# Patient Record
Sex: FEMALE | Race: WHITE | NOT HISPANIC OR LATINO | Employment: OTHER | URBAN - METROPOLITAN AREA
[De-identification: names, ages, dates, MRNs, and addresses within clinical notes are randomized per-mention and may not be internally consistent; named-entity substitution may affect disease eponyms.]

---

## 2022-01-14 PROBLEM — R42 DIZZINESS: Status: ACTIVE | Noted: 2022-01-14

## 2022-01-14 PROBLEM — R06.02 SHORTNESS OF BREATH: Status: ACTIVE | Noted: 2022-01-14

## 2022-01-14 NOTE — PROGRESS NOTES
Date of Office Visit: 2022  Encounter Provider: Dr. Enrico Sepulveda    Place of Service: [facility]  Patient Name: Miriam Riggs  :1965  No primary care provider on file.    No chief complaint on file.    History of Present Illness        Past Medical History:   Diagnosis Date   • Dizziness 2022   • Shortness of breath 2022         No past surgical history on file.      No current outpatient medications on file.             ROS    Procedures    Procedures    No orders to display           Objective:    There were no vitals taken for this visit.        Physical Exam        Assessment:       Diagnosis Plan   1. Shortness of breath     2. Dizziness              Plan:

## 2022-01-17 ENCOUNTER — OFFICE VISIT (OUTPATIENT)
Dept: CARDIOLOGY | Facility: CLINIC | Age: 57
End: 2022-01-17

## 2022-01-17 VITALS
SYSTOLIC BLOOD PRESSURE: 123 MMHG | OXYGEN SATURATION: 94 % | BODY MASS INDEX: 43.32 KG/M2 | HEIGHT: 67 IN | DIASTOLIC BLOOD PRESSURE: 78 MMHG | WEIGHT: 276 LBS | HEART RATE: 78 BPM

## 2022-01-17 DIAGNOSIS — R07.89 CHEST DISCOMFORT: ICD-10-CM

## 2022-01-17 DIAGNOSIS — E11.9 TYPE 2 DIABETES MELLITUS WITHOUT COMPLICATION, UNSPECIFIED WHETHER LONG TERM INSULIN USE: ICD-10-CM

## 2022-01-17 DIAGNOSIS — E78.2 MIXED HYPERLIPIDEMIA: ICD-10-CM

## 2022-01-17 DIAGNOSIS — R06.02 SHORTNESS OF BREATH: Primary | ICD-10-CM

## 2022-01-17 DIAGNOSIS — I10 ESSENTIAL HYPERTENSION: ICD-10-CM

## 2022-01-17 DIAGNOSIS — R42 DIZZINESS: ICD-10-CM

## 2022-01-17 PROBLEM — H52.03 HYPEROPIA OF BOTH EYES: Status: ACTIVE | Noted: 2020-01-07

## 2022-01-17 PROBLEM — H52.4 PRESBYOPIA: Status: ACTIVE | Noted: 2017-06-27

## 2022-01-17 PROBLEM — Z82.0 FAMILY HISTORY OF HUNTINGTON'S CHOREA: Status: ACTIVE | Noted: 2017-11-28

## 2022-01-17 PROBLEM — M19.90 OSTEOARTHRITIS: Status: ACTIVE | Noted: 2017-11-28

## 2022-01-17 PROBLEM — E66.01 MORBID OBESITY: Status: ACTIVE | Noted: 2017-11-28

## 2022-01-17 PROBLEM — R06.00 DYSPNEA: Status: ACTIVE | Noted: 2022-01-04

## 2022-01-17 PROBLEM — K43.9 HERNIA OF ANTERIOR ABDOMINAL WALL: Status: ACTIVE | Noted: 2017-11-28

## 2022-01-17 PROBLEM — R32 URINARY INCONTINENCE: Status: ACTIVE | Noted: 2017-11-28

## 2022-01-17 PROBLEM — H52.00 HYPERMETROPIA: Status: ACTIVE | Noted: 2017-06-27

## 2022-01-17 PROBLEM — R92.8 ABNORMAL FINDING ON MAMMOGRAPHY: Status: ACTIVE | Noted: 2017-11-28

## 2022-01-17 PROBLEM — L50.2 URTICARIA DUE TO HEAT: Status: ACTIVE | Noted: 2022-01-07

## 2022-01-17 PROBLEM — G47.33 OBSTRUCTIVE SLEEP APNEA SYNDROME: Status: ACTIVE | Noted: 2021-09-01

## 2022-01-17 PROBLEM — R73.03 PREDIABETES: Status: ACTIVE | Noted: 2018-12-09

## 2022-01-17 PROBLEM — F41.9 ANXIETY DISORDER: Status: ACTIVE | Noted: 2017-11-28

## 2022-01-17 PROCEDURE — 99204 OFFICE O/P NEW MOD 45 MIN: CPT | Performed by: INTERNAL MEDICINE

## 2022-01-17 RX ORDER — TOLTERODINE TARTRATE 2 MG/1
TABLET, EXTENDED RELEASE ORAL
COMMUNITY

## 2022-01-17 RX ORDER — BUSPIRONE HYDROCHLORIDE 10 MG/1
TABLET ORAL
COMMUNITY
Start: 2021-11-05

## 2022-01-17 RX ORDER — MELOXICAM 15 MG/1
TABLET ORAL
COMMUNITY

## 2022-01-17 RX ORDER — ATORVASTATIN CALCIUM 20 MG/1
TABLET, FILM COATED ORAL
COMMUNITY

## 2022-01-17 RX ORDER — ROPINIROLE 0.5 MG/1
TABLET, FILM COATED ORAL
COMMUNITY

## 2022-01-17 RX ORDER — LOSARTAN POTASSIUM 100 MG/1
TABLET ORAL
COMMUNITY
Start: 2021-11-05

## 2022-01-17 RX ORDER — AMLODIPINE BESYLATE 5 MG/1
10 TABLET ORAL DAILY
COMMUNITY
Start: 2021-12-28 | End: 2022-09-12

## 2022-01-17 NOTE — PROGRESS NOTES
Date of Office Visit: 2022  Encounter Provider: Dr. Enrico Sepulveda    Place of Service: [facility] CARDIOLOGY [place]  Patient Name: Miriam Riggs  :1965  Rosalio Bruno MD    Chief Complaint   Patient presents with   • Consult   • Shortness of Breath   • Dizziness   • Hypertension   • Hyperlipidemia   • Diabetes     History of Present Illness:    I am pleased to see Mrs. Riggs in my office today as a new consultation.    As you know, patient is 58-year-old white female whose past medical history is significant for hypertension, hyperlipidemia, diabetes mellitus, and obesity, who is referred to me for symptom of shortness of breath and chest heaviness.    Patient reports that she is short of breath on activities of daily life.  When she walks she became extremely short of breath.  Patient was able to do some activities a year ago she cannot execute those activity now.  She has to take multiple stockings before she finish her chores.  She also complain of chest fullness or heaviness when she exerts.  Patient denies any orthopnea, PND, syncope or presyncope.  Patient does not report dizziness.  She does complain of exertional palpitation    Patient does not have previous history of CAD, PCI or MI.  Patient does not smoke or abuse alcohol.    EKG done in my office showed normal sinus rhythm.  No ST changes suggestive of ischemia.    Patient underwent PFT and they are relatively unremarkable.    At this stage, I would recommend that patient should proceed with stress test with Cardiolite imaging with Lexiscan protocol.  She cannot walk on a treadmill.  Echocardiogram would be done to assess the left ventricle function.  Patient is advised to come to the hospital if there is worsening of symptoms                Past Medical History:   Diagnosis Date   • Dizziness 2022   • Shortness of breath 2022         History reviewed. No pertinent surgical history.        Current Outpatient Medications:  "  •  amLODIPine (NORVASC) 5 MG tablet, Take 10 mg by mouth Daily., Disp: , Rfl:   •  atorvastatin (LIPITOR) 20 MG tablet, atorvastatin 20 mg tablet  Take 1 tablet by mouth once daily, Disp: , Rfl:   •  busPIRone (BUSPAR) 10 MG tablet, , Disp: , Rfl:   •  losartan (COZAAR) 100 MG tablet, , Disp: , Rfl:   •  meloxicam (MOBIC) 15 MG tablet, meloxicam 15 mg tablet  TAKE 1 TABLET BY MOUTH ONCE DAILY, Disp: , Rfl:   •  metFORMIN (GLUCOPHAGE) 500 MG tablet, , Disp: , Rfl:   •  rOPINIRole (REQUIP) 0.5 MG tablet, ropinirole 0.5 mg tablet  TAKE 1 TABLET BY MOUTH ONCE DAILY AT BEDTIME, Disp: , Rfl:   •  tolterodine (DETROL) 2 MG tablet, , Disp: , Rfl:       Social History     Socioeconomic History   • Marital status:    Tobacco Use   • Smoking status: Never Smoker   • Smokeless tobacco: Never Used   Vaping Use   • Vaping Use: Never used   Substance and Sexual Activity   • Alcohol use: Never   • Drug use: Never   • Sexual activity: Defer         Review of Systems   Constitutional: Negative for chills and fever.   HENT: Negative for ear discharge and nosebleeds.    Eyes: Negative for discharge and redness.   Cardiovascular: Positive for chest pain. Negative for orthopnea, palpitations, paroxysmal nocturnal dyspnea and syncope.   Respiratory: Positive for shortness of breath. Negative for cough and wheezing.    Endocrine: Negative for heat intolerance.   Skin: Negative for rash.   Musculoskeletal: Positive for arthritis and joint pain. Negative for myalgias.   Gastrointestinal: Negative for abdominal pain, melena, nausea and vomiting.   Genitourinary: Negative for dysuria and hematuria.   Neurological: Negative for dizziness, light-headedness, numbness and tremors.   Psychiatric/Behavioral: Negative for depression. The patient is not nervous/anxious.        Procedures    Procedures    No orders to display           Objective:    /78   Pulse 78   Ht 170.2 cm (67\")   Wt 125 kg (276 lb)   SpO2 94%   BMI 43.23 kg/m² "         Constitutional:       Appearance: Well-developed.   Eyes:      General: No scleral icterus.        Right eye: No discharge.   HENT:      Head: Normocephalic and atraumatic.   Neck:      Thyroid: No thyromegaly.      Lymphadenopathy: No cervical adenopathy.   Pulmonary:      Effort: Pulmonary effort is normal. No respiratory distress.      Breath sounds: Normal breath sounds. No wheezing. No rales.   Cardiovascular:      Normal rate. Regular rhythm.      No gallop.   Abdominal:      Tenderness: There is no abdominal tenderness.   Skin:     Findings: No erythema or rash.   Neurological:      Mental Status: Alert and oriented to person, place, and time.             Assessment:       Diagnosis Plan   1. Shortness of breath     2. Dizziness     3. Essential hypertension     4. Mixed hyperlipidemia     5. Type 2 diabetes mellitus without complication, unspecified whether long term insulin use (HCC)     6. Chest discomfort              Plan:       MDM:    1.  Shortness of breath:    I would recommend to proceed with echocardiogram    2.  Chest discomfort:    Patient has significant risk factor and her symptoms are concerning I would recommend to proceed with stress test.    3.  Hypertension:    Blood pressure is controlled at present    4.  Diabetes mellitus:    I discussed in detail with the patient about possible bariatric surgery and weight loss.  Diet modification discussed.  Patient wants to think about it

## 2022-02-07 ENCOUNTER — OUTSIDE FACILITY SERVICE (OUTPATIENT)
Dept: CARDIOLOGY | Facility: CLINIC | Age: 57
End: 2022-02-07

## 2022-02-07 PROCEDURE — 93018 CV STRESS TEST I&R ONLY: CPT | Performed by: INTERNAL MEDICINE

## 2022-02-07 PROCEDURE — 93016 CV STRESS TEST SUPVJ ONLY: CPT | Performed by: INTERNAL MEDICINE

## 2022-02-07 PROCEDURE — 78452 HT MUSCLE IMAGE SPECT MULT: CPT | Performed by: INTERNAL MEDICINE

## 2022-08-04 PROBLEM — G10: Status: ACTIVE | Noted: 2022-02-05

## 2022-09-09 NOTE — PROGRESS NOTES
Date of Office Visit: 2022  Encounter Provider: Dr. Enrico Sepulveda    Place of Service: [facility] CARDIOLOGY [place]  Patient Name: Miriam Riggs  :1965  Rosalio Bruno MD    Chief Complaint   Patient presents with   • Hypertension   • Hyperlipidemia   • Diabetes   • Shortness of Breath     History of Present Illness    I am pleased to see Mrs. Riggs in my office today as a follow-up.    As you know, patient is 57-year-old white female whose past medical history is significant for hypertension, hyperlipidemia, diabetes mellitus, and obesity, who came today for follow-up.    In 2022, patient was referred to me for symptom of chest pain and shortness of breath.  Patient underwent stress test which was negative for ischemia or myocardial infarction.  Echocardiogram could not be done as it was not approved by insurance company.    Patient came today for follow-up.  She brought the blood pressure logbook and most of the blood pressures were between normal range.  Patient denies any chest pain or tightness or heaviness.  No orthopnea PND no syncope or presyncope.  No leg edema noted.    EKG showed normal sinus rhythm.  Heart rate is 70 bpm.  No change from previous EKG.    At this stage, I would recommend that patient should proceed with current treatment.  Blood pressure is very well controlled.  I will not make any changes.  Weight loss and diabetic control is emphasized..            Past Medical History:   Diagnosis Date   • Diabetes mellitus (HCC)    • Dizziness 2022   • Hyperlipidemia    • Hypertension    • Shortness of breath 2022         History reviewed. No pertinent surgical history.        Current Outpatient Medications:   •  ALPRAZolam (XANAX) 0.5 MG tablet, alprazolam 0.5 mg tablet  Take 1 tablet every day by oral route as needed., Disp: , Rfl:   •  amLODIPine (NORVASC) 10 MG tablet, Take 10 mg by mouth Daily., Disp: , Rfl:   •  atorvastatin (LIPITOR) 20 MG tablet,  atorvastatin 20 mg tablet  Take 1 tablet by mouth once daily, Disp: , Rfl:   •  bisoprolol-hydrochlorothiazide (ZIAC) 10-6.25 MG per tablet, Take 1 tablet by mouth Daily., Disp: , Rfl:   •  busPIRone (BUSPAR) 10 MG tablet, , Disp: , Rfl:   •  losartan (COZAAR) 100 MG tablet, , Disp: , Rfl:   •  meloxicam (MOBIC) 15 MG tablet, meloxicam 15 mg tablet  TAKE 1 TABLET BY MOUTH ONCE DAILY, Disp: , Rfl:   •  metFORMIN (GLUCOPHAGE) 500 MG tablet, Take 500 mg by mouth 2 (Two) Times a Day With Meals., Disp: , Rfl:   •  rOPINIRole (REQUIP) 0.5 MG tablet, ropinirole 0.5 mg tablet  TAKE 1 TABLET BY MOUTH ONCE DAILY AT BEDTIME, Disp: , Rfl:   •  tolterodine (DETROL) 2 MG tablet, , Disp: , Rfl:   •  triamcinolone (KENALOG) 0.5 % cream, , Disp: , Rfl:       Social History     Socioeconomic History   • Marital status:    Tobacco Use   • Smoking status: Never Smoker   • Smokeless tobacco: Never Used   Vaping Use   • Vaping Use: Never used   Substance and Sexual Activity   • Alcohol use: Never   • Drug use: Never   • Sexual activity: Defer         Review of Systems   Constitutional: Negative for chills and fever.   HENT: Negative for ear discharge and nosebleeds.    Eyes: Negative for discharge and redness.   Cardiovascular: Negative for chest pain, orthopnea, palpitations, paroxysmal nocturnal dyspnea and syncope.   Respiratory: Positive for shortness of breath. Negative for cough and wheezing.    Endocrine: Negative for heat intolerance.   Skin: Negative for rash.   Musculoskeletal: Positive for arthritis and joint pain. Negative for myalgias.   Gastrointestinal: Negative for abdominal pain, melena, nausea and vomiting.   Genitourinary: Negative for dysuria and hematuria.   Neurological: Negative for dizziness, light-headedness, numbness and tremors.   Psychiatric/Behavioral: Negative for depression. The patient is not nervous/anxious.        Procedures    Procedures    No orders to display           Objective:    /74   " Pulse 70   Ht 170.2 cm (67.01\")   Wt 128 kg (283 lb)   SpO2 96%   BMI 44.31 kg/m²         Constitutional:       Appearance: Well-developed.   Eyes:      General: No scleral icterus.        Right eye: No discharge.   HENT:      Head: Normocephalic and atraumatic.   Neck:      Thyroid: No thyromegaly.      Lymphadenopathy: No cervical adenopathy.   Pulmonary:      Effort: Pulmonary effort is normal. No respiratory distress.      Breath sounds: Normal breath sounds. No wheezing. No rales.   Cardiovascular:      Normal rate. Regular rhythm.      No gallop.   Abdominal:      Tenderness: There is no abdominal tenderness.   Skin:     Findings: No erythema or rash.   Neurological:      Mental Status: Alert and oriented to person, place, and time.             Assessment:       Diagnosis Plan   1. Essential hypertension     2. Mixed hyperlipidemia     3. Type 2 diabetes mellitus without complication, unspecified whether long term insulin use (HCC)              Plan:       MDM:    1.  Chest discomfort:    Patient underwent stress test and it was negative for ischemia.  Continue to observe    2.  Shortness of breath:    Gated SPECT imaging showed preserved left ventricular function but echocardiogram was denied by insurance company.  Patient is advised to increase aerobic activity.  Weight loss is emphasized    3.  Hypertension:    Blood pressure is very well controlled on current treatment which includes losartan and amlodipine.    4.  Diabetes mellitus:    Patient is advised to lose weight.  Diet modification discussed  "

## 2022-09-12 ENCOUNTER — OFFICE VISIT (OUTPATIENT)
Dept: CARDIOLOGY | Facility: CLINIC | Age: 57
End: 2022-09-12

## 2022-09-12 VITALS
BODY MASS INDEX: 44.42 KG/M2 | WEIGHT: 283 LBS | HEART RATE: 70 BPM | DIASTOLIC BLOOD PRESSURE: 74 MMHG | HEIGHT: 67 IN | SYSTOLIC BLOOD PRESSURE: 123 MMHG | OXYGEN SATURATION: 96 %

## 2022-09-12 DIAGNOSIS — E11.9 TYPE 2 DIABETES MELLITUS WITHOUT COMPLICATION, UNSPECIFIED WHETHER LONG TERM INSULIN USE: ICD-10-CM

## 2022-09-12 DIAGNOSIS — E78.2 MIXED HYPERLIPIDEMIA: ICD-10-CM

## 2022-09-12 DIAGNOSIS — I10 ESSENTIAL HYPERTENSION: Primary | ICD-10-CM

## 2022-09-12 PROBLEM — H52.223 REGULAR ASTIGMATISM OF BOTH EYES: Status: ACTIVE | Noted: 2022-04-14

## 2022-09-12 PROBLEM — H04.201 RIGHT EPIPHORA: Status: ACTIVE | Noted: 2022-04-14

## 2022-09-12 PROCEDURE — 99214 OFFICE O/P EST MOD 30 MIN: CPT | Performed by: INTERNAL MEDICINE

## 2022-09-12 RX ORDER — ALPRAZOLAM 0.5 MG/1
TABLET ORAL
COMMUNITY

## 2022-09-12 RX ORDER — AMLODIPINE BESYLATE 10 MG/1
10 TABLET ORAL DAILY
COMMUNITY
Start: 2022-08-31

## 2022-09-12 RX ORDER — BISOPROLOL FUMARATE AND HYDROCHLOROTHIAZIDE 10; 6.25 MG/1; MG/1
1 TABLET ORAL DAILY
COMMUNITY
Start: 2022-08-31

## 2022-09-12 RX ORDER — TRIAMCINOLONE ACETONIDE 5 MG/G
CREAM TOPICAL
COMMUNITY
Start: 2022-09-10

## 2023-09-29 NOTE — PROGRESS NOTES
Date of Office Visit: 10/02/2023  Encounter Provider: Dr. Enrico Sepulveda  Place of Service: [facility] CARDIOLOGY [place]  Patient Name: Miriam Riggs  :1965  Rosalio Burno MD    Chief Complaint   Patient presents with    Hypertension    Hyperlipidemia    Shortness of Breath    Follow-up     History of Present Illness    I am pleased to see Mrs. Riggs in my office today as a follow-up.    As you know, patient is 58-year-old white female whose past medical history is significant for hypertension, hyperlipidemia, diabetes mellitus, and obesity, who came today for follow-up.    In 2022, patient was referred to me for symptom of chest pain and shortness of breath.  Patient underwent stress test which was negative for ischemia or myocardial infarction.  Echocardiogram could not be done as it was not approved by insurance company.    Patient came today for follow-up.  She is overall doing well.  She continues to have shortness of breath.  Patient denies any orthopnea, PND, syncope or presyncope.  No leg edema noted.  No palpitation.    EKG showed normal sinus rhythm    At this stage, patient is doing well.  EKG showed normal changes from previous EKG.  Patient has baseline shortness of breath that has not changed.  No angina pectoris.  Blood pressure is controlled current treatment will be continued with Ziac and amlodipine and losartan.  Diabetic control is recommended exercise and weight loss emphasized I will see the patient as needed.      Past Medical History:   Diagnosis Date    Diabetes mellitus     Dizziness 2022    Hyperlipidemia     Hypertension     Shortness of breath 2022         History reviewed. No pertinent surgical history.        Current Outpatient Medications:     ALPRAZolam (XANAX) 0.5 MG tablet, alprazolam 0.5 mg tablet  Take 1 tablet every day by oral route as needed., Disp: , Rfl:     amLODIPine (NORVASC) 10 MG tablet, Take 1 tablet by mouth Daily., Disp: , Rfl:      atorvastatin (LIPITOR) 20 MG tablet, atorvastatin 20 mg tablet  Take 1 tablet by mouth once daily, Disp: , Rfl:     bisoprolol-hydrochlorothiazide (ZIAC) 10-6.25 MG per tablet, Take 1 tablet by mouth Daily., Disp: , Rfl:     busPIRone (BUSPAR) 10 MG tablet, , Disp: , Rfl:     losartan (COZAAR) 100 MG tablet, , Disp: , Rfl:     meloxicam (MOBIC) 15 MG tablet, meloxicam 15 mg tablet  TAKE 1 TABLET BY MOUTH ONCE DAILY, Disp: , Rfl:     metFORMIN (GLUCOPHAGE) 500 MG tablet, Take 1 tablet by mouth 2 (Two) Times a Day With Meals., Disp: , Rfl:     rOPINIRole (REQUIP) 0.5 MG tablet, ropinirole 0.5 mg tablet  TAKE 1 TABLET BY MOUTH ONCE DAILY AT BEDTIME, Disp: , Rfl:     tolterodine (DETROL) 2 MG tablet, , Disp: , Rfl:     triamcinolone (KENALOG) 0.5 % cream, , Disp: , Rfl:       Social History     Socioeconomic History    Marital status:    Tobacco Use    Smoking status: Never    Smokeless tobacco: Never   Vaping Use    Vaping Use: Never used   Substance and Sexual Activity    Alcohol use: Never    Drug use: Never    Sexual activity: Defer         Review of Systems   Constitutional: Negative for chills and fever.   HENT:  Negative for ear discharge and nosebleeds.    Eyes:  Negative for discharge and redness.   Cardiovascular:  Negative for chest pain, orthopnea, palpitations, paroxysmal nocturnal dyspnea and syncope.   Respiratory:  Positive for shortness of breath. Negative for cough and wheezing.    Endocrine: Negative for heat intolerance.   Skin:  Negative for rash.   Musculoskeletal:  Negative for arthritis and myalgias.   Gastrointestinal:  Negative for abdominal pain, melena, nausea and vomiting.   Genitourinary:  Negative for dysuria and hematuria.   Neurological:  Negative for dizziness, light-headedness, numbness and tremors.   Psychiatric/Behavioral:  Negative for depression. The patient is not nervous/anxious.      Procedures    Procedures    No orders to display           Objective:    /75 (BP  "Location: Right arm, Patient Position: Sitting, Cuff Size: Large Adult)   Pulse 63   Ht 170.2 cm (67.01\")   Wt 128 kg (283 lb)   SpO2 95%   BMI 44.31 kg/m²         Constitutional:       Appearance: Well-developed.   Eyes:      General: No scleral icterus.        Right eye: No discharge.   HENT:      Head: Normocephalic and atraumatic.   Neck:      Thyroid: No thyromegaly.      Lymphadenopathy: No cervical adenopathy.   Pulmonary:      Effort: Pulmonary effort is normal. No respiratory distress.      Breath sounds: Normal breath sounds. No wheezing. No rales.   Cardiovascular:      Normal rate. Regular rhythm.      No gallop.    Edema:     Peripheral edema absent.   Abdominal:      Tenderness: There is no abdominal tenderness.   Skin:     Findings: No erythema or rash.   Neurological:      Mental Status: Alert and oriented to person, place, and time.           Assessment:       Diagnosis Plan   1. Essential hypertension        2. Mixed hyperlipidemia        3. Type 2 diabetes mellitus without complication, unspecified whether long term insulin use                 Plan:       MDM:    1.  Shortness of breath:    Most likely cause of shortness of breath is deconditioning.  However she if she continues to have shortness of breath, I would recommend to proceed with PFT and CAT scan of the chest.  Patient does not seem to be in congestive heart failure    2.  Hypertension:    Blood pressure is controlled.  Continue current therapy    3.  Obstructive sleep apnea:    Patient is on sleep mask CPAP.  Patient is compliant with therapy    4.  Diabetes mellitus:    Patient has type 2 diabetes mellitus.  Patient is on metformin diet modification weight loss emphasized.    5.  Hyperlipidemia:    Patient is on Lipitor repeat lipid panel testing  "

## 2023-10-02 ENCOUNTER — OFFICE VISIT (OUTPATIENT)
Dept: CARDIOLOGY | Facility: CLINIC | Age: 58
End: 2023-10-02
Payer: COMMERCIAL

## 2023-10-02 VITALS
SYSTOLIC BLOOD PRESSURE: 130 MMHG | WEIGHT: 283 LBS | HEART RATE: 63 BPM | BODY MASS INDEX: 44.42 KG/M2 | HEIGHT: 67 IN | DIASTOLIC BLOOD PRESSURE: 75 MMHG | OXYGEN SATURATION: 95 %

## 2023-10-02 DIAGNOSIS — E78.2 MIXED HYPERLIPIDEMIA: ICD-10-CM

## 2023-10-02 DIAGNOSIS — I10 ESSENTIAL HYPERTENSION: Primary | ICD-10-CM

## 2023-10-02 DIAGNOSIS — E11.9 TYPE 2 DIABETES MELLITUS WITHOUT COMPLICATION, UNSPECIFIED WHETHER LONG TERM INSULIN USE: ICD-10-CM

## 2023-10-02 PROCEDURE — 99214 OFFICE O/P EST MOD 30 MIN: CPT | Performed by: INTERNAL MEDICINE
